# Patient Record
Sex: MALE | URBAN - METROPOLITAN AREA
[De-identification: names, ages, dates, MRNs, and addresses within clinical notes are randomized per-mention and may not be internally consistent; named-entity substitution may affect disease eponyms.]

---

## 2019-10-12 ENCOUNTER — EMERGENCY (EMERGENCY)
Age: 39
LOS: 1 days | Discharge: HOME | End: 2019-10-12
Admitting: EMERGENCY MEDICINE
Payer: COMMERCIAL

## 2019-10-12 VITALS
SYSTOLIC BLOOD PRESSURE: 104 MMHG | RESPIRATION RATE: 16 BRPM | HEART RATE: 86 BPM | TEMPERATURE: 99 F | OXYGEN SATURATION: 96 % | DIASTOLIC BLOOD PRESSURE: 79 MMHG

## 2019-10-12 VITALS
SYSTOLIC BLOOD PRESSURE: 115 MMHG | OXYGEN SATURATION: 97 % | RESPIRATION RATE: 18 BRPM | WEIGHT: 185.19 LBS | TEMPERATURE: 99 F | HEART RATE: 109 BPM | DIASTOLIC BLOOD PRESSURE: 84 MMHG

## 2019-10-12 PROCEDURE — 73110 X-RAY EXAM OF WRIST: CPT | Mod: 26,RT

## 2019-10-12 PROCEDURE — 99283 EMERGENCY DEPT VISIT LOW MDM: CPT

## 2019-10-12 PROCEDURE — 73090 X-RAY EXAM OF FOREARM: CPT | Mod: 26,RT

## 2019-10-12 RX ORDER — TETANUS TOXOID, REDUCED DIPHTHERIA TOXOID AND ACELLULAR PERTUSSIS VACCINE, ADSORBED 5; 2.5; 8; 8; 2.5 [IU]/.5ML; [IU]/.5ML; UG/.5ML; UG/.5ML; UG/.5ML
0.5 SUSPENSION INTRAMUSCULAR ONCE
Refills: 0 | Status: COMPLETED | OUTPATIENT
Start: 2019-10-12 | End: 2019-10-12

## 2019-10-12 RX ORDER — IBUPROFEN 200 MG
600 TABLET ORAL ONCE
Refills: 0 | Status: COMPLETED | OUTPATIENT
Start: 2019-10-12 | End: 2019-10-12

## 2019-10-12 RX ADMIN — TETANUS TOXOID, REDUCED DIPHTHERIA TOXOID AND ACELLULAR PERTUSSIS VACCINE, ADSORBED 0.5 MILLILITER(S): 5; 2.5; 8; 8; 2.5 SUSPENSION INTRAMUSCULAR at 16:42

## 2019-10-12 RX ADMIN — Medication 600 MILLIGRAM(S): at 16:33
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
## 2019-10-12 NOTE — ED PROVIDER NOTE - PATIENT PORTAL LINK FT
You can access the FollowMyHealth Patient Portal offered by [facility] by registering at the following website: [email or web address]. By joining [facility]’s FollowMyHealth portal, you will also be able to view your health information using other applications (apps) compatible with our system.
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
## 2019-10-12 NOTE — ED PROVIDER NOTE - EXTREMITY EXAM
right upper extremity findings/right forearm- abrasion preset, tender over medial forearm and wrist. ROM joints full, sensation intact, distal cap refill <2s.

## 2019-10-12 NOTE — ED PROVIDER NOTE - PROGRESS NOTE DETAILS
preliminary xray reveals no acute fracture or dislocations. patient requesting to be discharged before final read returned.
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
## 2022-07-14 NOTE — ED STATDOCS - PRO INTERPRETER NEED 2
H&P reviewed  After examining the patient I find no changes in the patients condition since the H&P had been written  There were no vitals filed for this visit  Plan for I+D right great toe wound and closure with application of negative pressure wound dressing 
English

## 2023-09-21 NOTE — ED PEDIATRIC TRIAGE NOTE - NS ED NURSE BANDS TYPE
Performed [facility] 10-2 and OCT for use of Plaquenil.    Will go over results with patient today. See today's eye exam progress note for test review.     Sydnie Kelley, OD  
Name band;
Referred To Asc For Closure Text (Leave Blank If You Do Not Want): After obtaining clear surgical margins the patient was sent to an ASC for surgical repair.  The patient understands they will receive post-surgical care and follow-up from the ASC physician.

## 2025-04-17 NOTE — ED PROVIDER NOTE - DATE/TIME 1
Please let the patient know that most recent lab work in terms of renal parameters and electrolytes are stable.    He supposedly left AGAINST MEDICAL ADVICE yesterday.  Please follow-up with cardiology for his diuretic management.  He has a follow-up appointment with Dr. Reyes already scheduled please make sure he has an order for renal function panel to be done prior to that appointment  Please do not miss your next appointment with Dr. Reyes.  Will discuss further at the upcoming visit, let me know if they have any questions or concerns.    Thanks  
12-Oct-2019 17:03